# Patient Record
Sex: FEMALE | Race: WHITE | NOT HISPANIC OR LATINO | URBAN - METROPOLITAN AREA
[De-identification: names, ages, dates, MRNs, and addresses within clinical notes are randomized per-mention and may not be internally consistent; named-entity substitution may affect disease eponyms.]

---

## 2019-10-03 ENCOUNTER — DOCUMENTATION ONLY (OUTPATIENT)
Dept: GASTROENTEROLOGY | Facility: CLINIC | Age: 34
End: 2019-10-03

## 2019-10-03 NOTE — PROGRESS NOTES
Referring Provider and Clinic: Wayne Khalil ( [place])     Diagnosis:  Gastroparesis     GI notes or primary provider notes related to GI problem: Y        Pathology Reports: N    Recent Lab Reports: Y    Radiology Reports (CT/MRI) :    Endoscopy:  N    Colonoscopy: N        Referral Date: 9/24/19    Date complete records received and sent for review:     Date records scanned into epic:     Provider Review Date:     Date review routed back to sender:     Letter sent:     Notes:

## 2019-10-03 NOTE — LETTER
October 28, 2019      TO: Omar Tracy  [address] [place] [address]         I have reviewed your medical records and thank you for your time and effort in submitting them.    At this time, the demand for our services is very high and we are sorry that we cannot see you at this time.  We recommend that you see your previous gastroenterologist or the contact a local GI provider from the list below.      Sincerely,  Jaime Acosta MD  Department of Gastroenterology [facility]  [phone]    [facility]  [phone]    [facility]  [phone]    [facility]  [phone]          Sincerely,      Jaime Acosta

## 2019-10-18 ENCOUNTER — TELEPHONE (OUTPATIENT)
Dept: GASTROENTEROLOGY | Facility: CLINIC | Age: 34
End: 2019-10-18

## 2019-10-18 ENCOUNTER — MEDICAL CORRESPONDENCE (OUTPATIENT)
Dept: HEALTH INFORMATION MANAGEMENT | Facility: CLINIC | Age: 34
End: 2019-10-18

## 2019-10-18 NOTE — TELEPHONE ENCOUNTER
[facility] Call Center    Phone Message    May a detailed message be left on voicemail: yes    Reason for Call: Other: Per Pt, is going to contact Remi to have her records be faxed over to start the process of being able to be seen within the clinic. Pt has been seeing Sadaf Sharp within the General Surgery Dept with [facility]. Pt was referred for Gastroparaesis. Pt states the number to [facility] is [phone].      Action Taken: Message routed to:  [facility]

## 2019-10-24 NOTE — TELEPHONE ENCOUNTER
[facility] Call Center    Phone Message    May a detailed message be left on voicemail: yes    Reason for Call: Other:      Referral in Epic.   Flagged as ASAP priority.   Pt being referred by Sadaf Sharp.    Please call Omar back asap to get her scheduled ASAP.            Action Taken: Message routed to:  [facility] Gastro

## 2019-10-25 NOTE — PROGRESS NOTES
Viridiana Palmer 10/25 9:47AM    Action Taken Per Dr. Avila who will be leaving [facility], asked to fwd recs to alt GI provider to review recs. Recs are being forwarded to Dave FOSS to review recs and to give recommendations for pt.

## 2019-10-26 NOTE — PROGRESS NOTES
REFERRAL REVIEW FORM    Is this a second opinion from another GI physician?  (If yes, OK to refer to for an MD only clinic visit)  No    Reason for referral:Gastroparesis    Date records reviewed: October 26, 2019     Previous work up:    Labs  GI CONSULT    Summary of pertinent GI work-up:  I spoke to Dr. Sahara Romero , the motility specialist at [facility], she stated due to pt is misusing her central line, (which was documented in hospital and home care that she had digged around her central line multiple times), Dr. Romero does not think she can manage this pt.   Dr. Romero also related pt was terminated by GI group in [facility] for the same reason. So does the two home care agents .     Dr. Romero stated we certainly can referral pt to [facility] GI services, may be [facility]. She wanted me to relay to Omar the reason that she can not continue taking care of her.     I called Omar , she again denied that she did anything to her line, also stated hospital never told her why they are not giving her TPN.     She is ok that I referral her to  GI.         Recommendation:    Do not schedule--send letter to patient with other list of GI providers - CC to PCP    When to schedule:  NA    Comments:

## 2019-10-28 NOTE — TELEPHONE ENCOUNTER
Called back to find out if GI denial letter was received. Left vm to call back if needing the resources on the letter repeated.

## 2019-11-01 ENCOUNTER — TELEPHONE (OUTPATIENT)
Dept: GASTROENTEROLOGY | Facility: CLINIC | Age: 34
End: 2019-11-01

## 2019-11-01 NOTE — TELEPHONE ENCOUNTER
[facility] Call Center    Phone Message    May a detailed message be left on voicemail: yes    Reason for Call: Other: [employer]  calling requesting the denial letter be faxed to her so she can assist patient in scheduling an appointment with another provider/clinic. please fax letter to [phone] thank you.      Action Taken: Message routed to:  [facility] Gastro

## 2019-11-04 NOTE — TELEPHONE ENCOUNTER
[facility] Call Center    Phone Message    May a detailed message be left on voicemail: yes    Reason for Call: Other: Samantha, patient's  with [facility]REA, had requested a copy of the denial letter be faxed to her. To move things along faster she is now wondering if she could instead be verbally informed of the information in the letter. Specifically the providers recommended. Call [phone]     Action Taken: Message routed to:  [facility] TEDDY